# Patient Record
Sex: FEMALE | Race: BLACK OR AFRICAN AMERICAN | NOT HISPANIC OR LATINO | URBAN - METROPOLITAN AREA
[De-identification: names, ages, dates, MRNs, and addresses within clinical notes are randomized per-mention and may not be internally consistent; named-entity substitution may affect disease eponyms.]

---

## 2018-12-19 ENCOUNTER — EMERGENCY (EMERGENCY)
Facility: HOSPITAL | Age: 63
LOS: 1 days | End: 2018-12-19
Payer: MEDICARE

## 2018-12-19 PROCEDURE — 99284 EMERGENCY DEPT VISIT MOD MDM: CPT | Mod: RT

## 2018-12-19 PROCEDURE — 70487 CT MAXILLOFACIAL W/DYE: CPT | Mod: 26

## 2023-05-04 ENCOUNTER — APPOINTMENT (OUTPATIENT)
Dept: FAMILY MEDICINE | Facility: CLINIC | Age: 68
End: 2023-05-04

## 2023-05-18 ENCOUNTER — APPOINTMENT (OUTPATIENT)
Dept: FAMILY MEDICINE | Facility: CLINIC | Age: 68
End: 2023-05-18
Payer: MEDICARE

## 2023-05-18 VITALS
HEART RATE: 106 BPM | OXYGEN SATURATION: 95 % | DIASTOLIC BLOOD PRESSURE: 60 MMHG | TEMPERATURE: 97.7 F | WEIGHT: 188 LBS | SYSTOLIC BLOOD PRESSURE: 90 MMHG | BODY MASS INDEX: 26.32 KG/M2 | HEIGHT: 71 IN | RESPIRATION RATE: 15 BRPM

## 2023-05-18 PROCEDURE — 99205 OFFICE O/P NEW HI 60 MIN: CPT

## 2023-05-19 NOTE — PHYSICAL EXAM
[Ill-Appearing] : ill-appearing [Chronically Ill] : chronically ill [Uncomfortable] : uncomfortable [Looks Tired] : appears tired [Normal Outer Ear/Nose] : the outer ears and nose were normal in appearance [No Respiratory Distress] : no respiratory distress  [Normal Rate] : normal rate  [Regular Rhythm] : with a regular rhythm [No Carotid Bruits] : no carotid bruits [No Edema] : there was no peripheral edema [No CVA Tenderness] : no CVA  tenderness [No Rash] : no rash [Normal Affect] : the affect was normal [Normal Insight/Judgement] : insight and judgment were intact

## 2023-05-19 NOTE — PLAN
[FreeTextEntry1] : requested medical records from [facility] and [place].\par \par [facility] contacted for possible home care, transportation services, unstable gait, medication administration and assistance setting up appointments. \par \par per patient and discharge paperwork given- she needs follow up with dental, cardio, neurosurgery, pulmonology and endocrinology.

## 2023-05-19 NOTE — REVIEW OF SYSTEMS
[Vision Problems] : vision problems [Joint Pain] : joint pain [Muscle Weakness] : muscle weakness [Unsteady Walking] : ataxia [Negative] : Heme/Lymph

## 2023-05-19 NOTE — HISTORY OF PRESENT ILLNESS
[Admitted on: ___] : The patient was admitted on [unfilled] [Discharged on ___] : discharged on [unfilled] [FreeTextEntry2] : Patient has had multiple hospital stays from [facility] [place], [facility] and [place] over the past month. Hospital records were requested due to the complexity of her medical conditions. Per patient she was diagnosed with an abscess in her sinuses, a mass on her lung that needs to be biopsied and a brain aneurysm. She had dental surgery, but needs to follow up with dental, needs a biopsy of her lung, and needs to see neurosurgery for the aneurysm. She has no assistance at home. Needs assistance with medications, home care and transportation. Her gait is unsteady and she has impaired vision in the left eye.

## 2023-05-25 ENCOUNTER — APPOINTMENT (OUTPATIENT)
Dept: FAMILY MEDICINE | Facility: CLINIC | Age: 68
End: 2023-05-25
Payer: MEDICARE

## 2023-05-25 VITALS
BODY MASS INDEX: 27.02 KG/M2 | OXYGEN SATURATION: 98 % | WEIGHT: 193 LBS | HEART RATE: 87 BPM | SYSTOLIC BLOOD PRESSURE: 120 MMHG | HEIGHT: 71 IN | DIASTOLIC BLOOD PRESSURE: 70 MMHG | TEMPERATURE: 97.6 F | RESPIRATION RATE: 15 BRPM

## 2023-05-25 DIAGNOSIS — Z09 ENCOUNTER FOR FOLLOW-UP EXAMINATION AFTER COMPLETED TREATMENT FOR CONDITIONS OTHER THAN MALIGNANT NEOPLASM: ICD-10-CM

## 2023-05-25 DIAGNOSIS — E11.9 TYPE 2 DIABETES MELLITUS W/OUT COMPLICATIONS: ICD-10-CM

## 2023-05-25 DIAGNOSIS — D35.2 BENIGN NEOPLASM OF PITUITARY GLAND: ICD-10-CM

## 2023-05-25 DIAGNOSIS — I67.1 CEREBRAL ANEURYSM, NONRUPTURED: ICD-10-CM

## 2023-05-25 PROCEDURE — 99495 TRANSJ CARE MGMT MOD F2F 14D: CPT

## 2023-05-25 RX ORDER — DOXYCYCLINE 100 MG/1
100 TABLET, FILM COATED ORAL
Qty: 20 | Refills: 0 | Status: ACTIVE | COMMUNITY
Start: 2023-05-25 | End: 1900-01-01

## 2023-05-25 RX ORDER — FLASH GLUCOSE SENSOR
KIT MISCELLANEOUS
Qty: 6 | Refills: 0 | Status: ACTIVE | COMMUNITY
Start: 2023-05-25 | End: 1900-01-01

## 2023-05-25 RX ORDER — ASPIRIN 81 MG/1
81 TABLET, COATED ORAL DAILY
Qty: 100 | Refills: 0 | Status: ACTIVE | COMMUNITY
Start: 2023-05-25 | End: 1900-01-01

## 2023-05-25 RX ORDER — BLOOD SUGAR DIAGNOSTIC
STRIP MISCELLANEOUS DAILY
Qty: 100 | Refills: 2 | Status: ACTIVE | COMMUNITY
Start: 2023-05-25 | End: 1900-01-01

## 2023-05-31 RX ORDER — FLASH GLUCOSE SCANNING READER
EACH MISCELLANEOUS
Qty: 6 | Refills: 0 | Status: DISCONTINUED | COMMUNITY
Start: 2023-05-25 | End: 2023-05-31

## 2023-06-05 ENCOUNTER — APPOINTMENT (OUTPATIENT)
Dept: FAMILY MEDICINE | Facility: CLINIC | Age: 68
End: 2023-06-05
Payer: MEDICARE

## 2023-06-05 ENCOUNTER — RX CHANGE (OUTPATIENT)
Age: 68
End: 2023-06-05

## 2023-06-05 VITALS
DIASTOLIC BLOOD PRESSURE: 66 MMHG | HEART RATE: 88 BPM | WEIGHT: 193.38 LBS | RESPIRATION RATE: 16 BRPM | TEMPERATURE: 97.6 F | SYSTOLIC BLOOD PRESSURE: 110 MMHG | HEIGHT: 70 IN | OXYGEN SATURATION: 96 % | BODY MASS INDEX: 27.69 KG/M2

## 2023-06-05 DIAGNOSIS — J01.10 ACUTE FRONTAL SINUSITIS, UNSPECIFIED: ICD-10-CM

## 2023-06-05 DIAGNOSIS — L03.211 CELLULITIS OF FACE: ICD-10-CM

## 2023-06-05 DIAGNOSIS — L02.01 CELLULITIS OF FACE: ICD-10-CM

## 2023-06-05 DIAGNOSIS — R04.2 HEMOPTYSIS: ICD-10-CM

## 2023-06-05 PROCEDURE — 99214 OFFICE O/P EST MOD 30 MIN: CPT

## 2023-06-05 RX ORDER — LANCETS 30 GAUGE
EACH MISCELLANEOUS
Qty: 3 | Refills: 1 | Status: DISCONTINUED | COMMUNITY
Start: 2023-05-31 | End: 2023-06-05

## 2023-06-05 RX ORDER — BLOOD-GLUCOSE METER
W/DEVICE EACH MISCELLANEOUS
Qty: 1 | Refills: 1 | Status: DISCONTINUED | COMMUNITY
Start: 2023-05-31 | End: 2023-06-05

## 2023-06-05 RX ORDER — FLASH GLUCOSE SENSOR
KIT MISCELLANEOUS
Qty: 6 | Refills: 0 | Status: ACTIVE | COMMUNITY
Start: 2023-06-05 | End: 1900-01-01

## 2023-06-05 RX ORDER — BLOOD SUGAR DIAGNOSTIC
STRIP MISCELLANEOUS
Qty: 4 | Refills: 2 | Status: DISCONTINUED | COMMUNITY
Start: 2023-05-31 | End: 2023-06-05

## 2023-06-05 RX ORDER — FLASH GLUCOSE SCANNING READER
EACH MISCELLANEOUS
Qty: 6 | Refills: 0 | Status: ACTIVE | COMMUNITY
Start: 2023-06-05 | End: 1900-01-01

## 2023-06-05 NOTE — PLAN
[FreeTextEntry1] : 67-year-old female presents for evaluation\par Lung mass–scheduled for biopsy this month\par Diabetes mellitus type 2–controlled on medication.  Jc sensor ordered\par Acute frontal sinusitis–questionable abscess\par Hemoptysis noted\par Referral to ENT

## 2023-06-05 NOTE — HEALTH RISK ASSESSMENT
[0] : 2) Feeling down, depressed, or hopeless: Not at all (0) [PHQ-2 Negative - No further assessment needed] : PHQ-2 Negative - No further assessment needed [[id number]] : 0

## 2023-06-06 ENCOUNTER — RX CHANGE (OUTPATIENT)
Age: 68
End: 2023-06-06

## 2023-06-08 NOTE — HEALTH RISK ASSESSMENT
[0] : 2) Feeling down, depressed, or hopeless: Not at all (0) [PHQ-2 Negative - No further assessment needed] : PHQ-2 Negative - No further assessment needed [[id number]] : 0

## 2023-06-08 NOTE — PLAN
[FreeTextEntry1] : 67-year-old female presents status post hospitalization\par Discharged 13 days ago from [facility]\par Facial abscess/cellulitis–secondary to gingivitis and poor oral hygiene she was placed on antibiotics including Unasyn.  Multiple dental extractions were obtained\par Brain aneurysm–CAT scan showed evidence of a brain aneurysm she was referred to brain surgery referral.  History of pituitary tumor.  To follow as an outpatient for angiogram\par Lung mass–she is scheduled subsequently to have a lung biopsy\par Diabetes mellitus–Follow-up lab work ...controlled with oral medications considered for hemoglobin A1c

## 2023-06-08 NOTE — HISTORY OF PRESENT ILLNESS
[FreeTextEntry1] : hosp  [de-identified] : Hospitalized and discharged 13 days ago\par Notified by this office Silvina/Melia Newberry\par Brain an \par lung mas \par d tay \par bp \par may 13  d c    facial celulitis \par for lung bx   \par pituitary adenoma

## 2023-06-23 ENCOUNTER — APPOINTMENT (OUTPATIENT)
Dept: FAMILY MEDICINE | Facility: CLINIC | Age: 68
End: 2023-06-23
Payer: MEDICARE

## 2023-06-23 VITALS
BODY MASS INDEX: 26.77 KG/M2 | DIASTOLIC BLOOD PRESSURE: 70 MMHG | SYSTOLIC BLOOD PRESSURE: 130 MMHG | TEMPERATURE: 97 F | HEART RATE: 88 BPM | OXYGEN SATURATION: 95 % | RESPIRATION RATE: 16 BRPM | WEIGHT: 187 LBS | HEIGHT: 70 IN

## 2023-06-23 DIAGNOSIS — D50.9 IRON DEFICIENCY ANEMIA, UNSPECIFIED: ICD-10-CM

## 2023-06-23 DIAGNOSIS — E78.5 HYPERLIPIDEMIA, UNSPECIFIED: ICD-10-CM

## 2023-06-23 DIAGNOSIS — E11.9 TYPE 2 DIABETES MELLITUS W/OUT COMPLICATIONS: ICD-10-CM

## 2023-06-23 DIAGNOSIS — I10 ESSENTIAL (PRIMARY) HYPERTENSION: ICD-10-CM

## 2023-06-23 DIAGNOSIS — R91.8 OTHER NONSPECIFIC ABNORMAL FINDING OF LUNG FIELD: ICD-10-CM

## 2023-06-23 PROCEDURE — 99214 OFFICE O/P EST MOD 30 MIN: CPT

## 2023-06-23 RX ORDER — GLIPIZIDE 2.5 MG/1
2.5 TABLET, FILM COATED, EXTENDED RELEASE ORAL DAILY
Qty: 90 | Refills: 0 | Status: ACTIVE | COMMUNITY
Start: 2023-06-23 | End: 1900-01-01

## 2023-06-23 RX ORDER — CHLORHEXIDINE GLUCONATE 4 %
325 (65 FE) LIQUID (ML) TOPICAL
Qty: 90 | Refills: 0 | Status: ACTIVE | COMMUNITY
Start: 2023-06-23 | End: 1900-01-01

## 2023-06-23 RX ORDER — ASPIRIN 81 MG/1
81 TABLET ORAL DAILY
Qty: 90 | Refills: 0 | Status: ACTIVE | COMMUNITY
Start: 2023-06-23

## 2023-06-23 NOTE — PLAN
[FreeTextEntry1] : Iron Def Anemia- continue daily ferrous sulfate 325mg. Medications reviewed and renewed. \par \par DM2- glipizide 2.5mg BID, last A1C in hospital.\par \par HTN- Lisinopril/hctz working well. /70. Medications reviewed and renewed. \par \par Lung mass- MR requested from pulmonologist. \par \par Brain aneurism- contact information given to patient for neurosurgery that was referred by Ashley Rodriguez. Patient will call and make an appointment. \par \par Fasting labs ordered, patient will complete out of office due to insurance before her next visit.

## 2023-06-23 NOTE — HISTORY OF PRESENT ILLNESS
[FreeTextEntry1] : med refills and clarification [de-identified] : Patient presents for refills on medications and clarification on her dosages.\par She was started on multiple new medications while in the hospital. Her metoprolol was DC's in May after low BP readings multiple times. \par She is following with pulm- was told her lung mass was cancerous and had her PET scan today. She follows up with oncology next friday. She will discuss a home aid with oncology next week. \par She has not followed up with neurosurgery yet, since they have not contacted her. After looking through her DC paperwork, [facility] neurosurgery info was obtained and given to patient.

## 2023-08-16 ENCOUNTER — RX RENEWAL (OUTPATIENT)
Age: 68
End: 2023-08-16